# Patient Record
Sex: FEMALE | Race: WHITE | Employment: UNEMPLOYED | URBAN - METROPOLITAN AREA
[De-identification: names, ages, dates, MRNs, and addresses within clinical notes are randomized per-mention and may not be internally consistent; named-entity substitution may affect disease eponyms.]

---

## 2017-03-05 PROBLEM — K63.2 ENTEROCUTANEOUS FISTULA: Status: ACTIVE | Noted: 2017-03-05

## 2017-03-05 PROBLEM — K65.1 ABSCESS OF ABDOMINAL CAVITY (HCC): Status: ACTIVE | Noted: 2017-03-05

## 2017-04-20 ENCOUNTER — TELEPHONE (OUTPATIENT)
Dept: WOUND CARE | Age: 42
End: 2017-04-20

## 2017-04-20 NOTE — TELEPHONE ENCOUNTER
Ostomy clinic outpatient visit: pt has been seen by [facility] ostomy for fistula suprapubic area, pt has Clubb flexible one piece pediatric pouch that was changed last night & is doing well, discussed some suggestions for skin care, products available like Skin [employer], taking eakins seal & smashing it in the package before applying for lower profile, Cera-ring by [employer]. We also discussed carefully adjusting the opening cutout without going too large. Pt already crusts reddened areas. Pt can also use lollipop no sting.    Yanely ANGUIANON, RN, [facility], [address]